# Patient Record
Sex: FEMALE | Race: WHITE
[De-identification: names, ages, dates, MRNs, and addresses within clinical notes are randomized per-mention and may not be internally consistent; named-entity substitution may affect disease eponyms.]

---

## 2018-05-02 ENCOUNTER — HOSPITAL ENCOUNTER (OUTPATIENT)
Dept: HOSPITAL 45 - C.ULTR | Age: 33
Discharge: HOME | End: 2018-05-02
Payer: COMMERCIAL

## 2018-05-02 DIAGNOSIS — E04.1: Primary | ICD-10-CM

## 2018-05-02 NOTE — DIAGNOSTIC IMAGING REPORT
GUIDANCE NEEDLE PLACEMENT



CLINICAL HISTORY: 32 years-old Female presenting with THRYOID NODULE- U/S DONE

APRIL 2018. 



TECHNIQUE: Real-time grayscale and limited color Doppler ultrasound imaging of

the thyroid was performed for ultrasound-guided fine-needle aspiration.



COMPARISON: None.



PROCEDURE: 

The risks, benefits, and alternatives of the procedure were discussed with the

patient. Written informed consent was obtained. A timeout was performed to

confirm patient identity. The patient was placed supine in ultrasound. The

thyroid was first evaluated by ultrasound.



1.  Interpolar spongiform heterogeneous circumscribed round nodule, measuring

5.1 cm. No calcification. (Very low suspicion)



The spongiform 5.1 cm nodule in the left lobe of the thyroid was localized by

ultrasound and selected for fine needle aspiration.  The left neck was prepped

and draped in the usual sterile fashion. The nodule was aspirated under

ultrasound guidance with 3 passes utilizing 25-gauge needles. Specimens were

reviewed by the pathologist at the time of biopsy and were deemed adequate for

diagnosis.



The patient tolerated the procedure well.



IMPRESSION: 

Successful fine-needle aspiration of the left thyroid nodule as above. Should

the pathology report return nondiagnostic results, this could be followed with

subsequent ultrasound in 6-12 months.







Electronically signed by:  Castro Perez M.D.

5/2/2018 11:02 AM



Dictated Date/Time:  5/2/2018 10:59 AM